# Patient Record
Sex: MALE | Race: BLACK OR AFRICAN AMERICAN
[De-identification: names, ages, dates, MRNs, and addresses within clinical notes are randomized per-mention and may not be internally consistent; named-entity substitution may affect disease eponyms.]

---

## 2019-02-21 ENCOUNTER — HOSPITAL ENCOUNTER (EMERGENCY)
Dept: HOSPITAL 36 - ER | Age: 59
Discharge: HOME | End: 2019-02-21
Payer: COMMERCIAL

## 2019-02-21 DIAGNOSIS — E87.6: ICD-10-CM

## 2019-02-21 DIAGNOSIS — E11.65: ICD-10-CM

## 2019-02-21 DIAGNOSIS — R00.2: Primary | ICD-10-CM

## 2019-02-21 DIAGNOSIS — Z98.890: ICD-10-CM

## 2019-02-21 DIAGNOSIS — I10: ICD-10-CM

## 2019-02-21 LAB
ALBUMIN SERPL-MCNC: 4.4 GM/DL (ref 4.2–5.5)
ALBUMIN/GLOB SERPL: 1.6 {RATIO} (ref 1–1.8)
ALP SERPL-CCNC: 56 U/L (ref 34–104)
ALT SERPL-CCNC: 31 U/L (ref 7–52)
AMPHET UR-MCNC: NEGATIVE NG/ML
ANION GAP SERPL CALC-SCNC: 12 MMOL/L (ref 7–16)
AST SERPL-CCNC: 35 U/L (ref 13–39)
BARBITURATES UR-MCNC: NEGATIVE UG/ML
BENZODIAZEPINES PNL UR: NEGATIVE
BILIRUB SERPL-MCNC: 1.1 MG/DL (ref 0.3–1)
BUN SERPL-MCNC: 18 MG/DL (ref 7–25)
CALCIUM SERPL-MCNC: 9.6 MG/DL (ref 8.6–10.3)
CANNABINOIDS SERPL QL CFM: NEGATIVE
CHLORIDE SERPL-SCNC: 97 MEQ/L (ref 98–107)
CHOLEST SERPL-MCNC: 130 MG/DL (ref ?–200)
CK SERPL-CCNC: 430 U/L (ref 30–223)
CO2 SERPL-SCNC: 27 MEQ/L (ref 21–31)
COCAINE METAB.OTHER UR-MCNC: NEGATIVE NG/ML
CREAT SERPL-MCNC: 1 MG/DL (ref 0.7–1.3)
D DIMER PPP FEU-MCNC: < 100 NG/ML (ref 100–400)
EOSINOPHIL NFR BLD: 1 % (ref 0–5)
ERYTHROCYTE [DISTWIDTH] IN BLOOD BY AUTOMATED COUNT: 12.7 % (ref 11.5–20)
GLOBULIN SER-MCNC: 2.8 GM/DL
GLUCOSE SERPL-MCNC: 293 MG/DL (ref 70–105)
HCT VFR BLD CALC: 45.1 % (ref 41–60)
HDLC SERPL-MCNC: 46 MG/DL (ref 23–92)
HGB BLD-MCNC: 14.4 GM/DL (ref 12–16)
INR PPP: 1.18 (ref 0.5–1.4)
LYMPHOCYTES # BLD MANUAL: 52 % (ref 20–50)
MCH RBC QN AUTO: 26.2 PG (ref 26–30)
MCHC RBC AUTO-ENTMCNC: 32 PG (ref 28–36)
MCV RBC AUTO: 81.8 FL (ref 80–99)
METHADONE UR CFM-MCNC: NEGATIVE NG/ML
METHAMPHET UR QL: NEGATIVE
MONOCYTES # BLD MANUAL: 9 % (ref 2–10)
NEUTROPHILS NFR BLD AUTO: 38 % (ref 40–80)
OPIATES UR QL: NEGATIVE
PCP UR-MCNC: NEGATIVE UG/L
PLATELET # BLD EST: ADEQUATE 10*3/UL
PLATELET # BLD: 169 TH/CMM (ref 150–400)
PMV BLD AUTO: 8.4 FL
POTASSIUM SERPL-SCNC: 3 MEQ/L (ref 3.5–5.1)
PROTHROMBIN TIME: 12.1 SECONDS (ref 9.5–11.5)
RBC # BLD AUTO: 5.51 MIL/CMM (ref 4.3–5.7)
SODIUM SERPL-SCNC: 133 MEQ/L (ref 136–145)
TRICYCLICS UR QL: NEGATIVE
TRIGL SERPL-MCNC: 48 MG/DL (ref ?–150)
WBC # BLD AUTO: 3.7 TH/CMM (ref 4.8–10.8)

## 2019-02-21 PROCEDURE — 85025 COMPLETE CBC W/AUTO DIFF WBC: CPT

## 2019-02-21 PROCEDURE — 96374 THER/PROPH/DIAG INJ IV PUSH: CPT

## 2019-02-21 PROCEDURE — 83880 ASSAY OF NATRIURETIC PEPTIDE: CPT

## 2019-02-21 PROCEDURE — 93005 ELECTROCARDIOGRAM TRACING: CPT

## 2019-02-21 PROCEDURE — 71045 X-RAY EXAM CHEST 1 VIEW: CPT

## 2019-02-21 PROCEDURE — Z7610: HCPCS

## 2019-02-21 PROCEDURE — Z7502: HCPCS

## 2019-02-21 PROCEDURE — 36415 COLL VENOUS BLD VENIPUNCTURE: CPT

## 2019-02-21 PROCEDURE — 85007 BL SMEAR W/DIFF WBC COUNT: CPT

## 2019-02-21 PROCEDURE — 80061 LIPID PANEL: CPT

## 2019-02-21 PROCEDURE — 82550 ASSAY OF CK (CPK): CPT

## 2019-02-21 PROCEDURE — 80307 DRUG TEST PRSMV CHEM ANLYZR: CPT

## 2019-02-21 PROCEDURE — 87040 BLOOD CULTURE FOR BACTERIA: CPT

## 2019-02-21 PROCEDURE — 83735 ASSAY OF MAGNESIUM: CPT

## 2019-02-21 PROCEDURE — 80053 COMPREHEN METABOLIC PANEL: CPT

## 2019-02-21 PROCEDURE — 83605 ASSAY OF LACTIC ACID: CPT

## 2019-02-21 PROCEDURE — 83036 HEMOGLOBIN GLYCOSYLATED A1C: CPT

## 2019-02-21 PROCEDURE — 85610 PROTHROMBIN TIME: CPT

## 2019-02-21 PROCEDURE — 82553 CREATINE MB FRACTION: CPT

## 2019-02-21 PROCEDURE — 99284 EMERGENCY DEPT VISIT MOD MDM: CPT

## 2019-02-21 PROCEDURE — 85379 FIBRIN DEGRADATION QUANT: CPT

## 2019-02-21 PROCEDURE — 84484 ASSAY OF TROPONIN QUANT: CPT

## 2019-02-21 PROCEDURE — 80320 DRUG SCREEN QUANTALCOHOLS: CPT

## 2019-02-21 NOTE — ED PHYSICIAN CHART
ED Chief Complaint/HPI





- Patient Information


Date Seen:: 19


Time Seen:: 07:10


Chief Complaint:: palpitations


History of Present Illness:: 


At about 0500 patient took Cozaar 25 mg and hydrochlorothiazide 12.5 mg a few 

minutes after which he developed palpitations (which were described as rapid 

heartbeat) and anxiety because of the palpitations.  Patient has been taking 

Cozaar and hydrochlorothiazide for last 1-2 years.  No chest pain or shortness 

of breath.  No recent vomiting or diarrhea


Allergies:: 


 Allergies











Allergy/AdvReac Type Severity Reaction Status Date / Time


 


No Known Allergies Allergy   Verified 19 05:27











Vitals:: 


 Vital Signs - 8 hr











  19





  05:30 05:38 07:14


 


Temp 97.2 F 98.0 F 98.2 F


 


 116 79


 


RR 19 12 13


 


/94 144/76 131/67


 


O2 Sat % 99  











Historian:: Patient





ED Review of Systems





- Review of Systems


General/Constitutional: No fever, No chills, No weight loss, No weakness, No 

diaphoresis, No edema, No loss of appetite


Skin: No skin lesions, No rash, No bruising


Head: No headache, No light-headedness


Eyes: No loss of vision, No pain, No diplopia


ENT: No earache, No nasal drainage, No sore throat, No tinnitus


Neck: No neck pain, No swelling, No thyromegaly, No stiffness, No mass noted


Cardio Vascular: No chest pain, Palpitations, No PND, No orthopnea, No edema


Pulmonary: No SOB, No cough, No sputum, No wheezing


GI: No nausea, No vomiting, No diarrhea, No pain, No melena, No hematochezia, 

No constipation, No hematemesis


G/U: No dysuria, No frequency, No hematuria


Musculoskeletal: No bone or joint pain, No back pain, No muscle pain


Endocrine: No polyuria, No polydipsia


Psychiatric: No prior psych history, No depression, Anxiety, No suicidal 

ideation


Hematopoietic: No bruising, No lymphadenopathy


Allergic/Immuno: No urticaria, No angioedema


Neurological: No syncope, No focal symptoms, No weakness, No paresthesia, No 

headache, No seizure, No dizziness, No confusion, No vertigo





ED Past Medical History





- Past Medical History


Past Medical History: HTN, Other (final allergies)


Family History: Diabetes Melitus, HTN


Social History: Non Smoker, No Alcohol


Surgical History: other (nose probably from nasal polyps)


Psychiatricy History: None


Medication: Reviewed





Family Medical History





- Family Member


  ** Mother


History Unknown: Yes


Living Status: 


Hx Family Diabetes: Yes





ED Physical Exam





- Physical Examination


General/Constitutional: Awake, Well-developed, well-nourished, Alert, No 

distress, GCS 15, Non-toxic appearing, Ambulatory


Head: Atraumatic


Eyes: Lids, conjuctiva normal, PERRL, EOMI


Skin: Nl inspection, No rash, No skin lesions, No ecchymosis, Well hydrated, No 

lymphadenopathy


ENMT: External ears, nose nl, Nasal exam nl


Other ENMT comments:: 





Several teeth absent


Neck: Nontender, Full ROM w/o pain, No JVD, No nuchal rigidity, No bruit, No 

mass, No stridor


Respiratory: Nl effort/Exclusion, Clear to Auscultation, No Wheeze/Rhonchi/Rales


Cardio Vascular: RRR, NL S1 S2


Other Cardio Vascular comments:: 





1.5 out of 4 systolic murmur heard along the left sternal border


GI: No tenderness/rebounding/guarding, No organomegaly, No hernia, Normal BS's, 

Nondistended, No mass/bruits, No McBurney tenderness


: No CVA tenderness


Extremities: No tenderness or effusion, Full ROM, normal strength in all 

extremities, No edema, Normal digits & nails


Neuro/Psych: Alert/oriented, DTR's symmetric, Normal sensory exam, Normal motor 

strength, Judgement/insight normal, Mood normal, Normal gait, No focal deficits


Misc: Normal back, No paraspinal tenderness





ED Labs/Radiology/EKG Results





- Lab Results


Results: 


 Laboratory Tests











  19





  05:56 05:56 05:56


 


WBC  3.7 L  


 


RBC  5.51  


 


Hgb  14.4  


 


Hct  45.1  


 


MCV  81.8  


 


MCH  26.2  


 


MCHC Differential  32.0  


 


RDW  12.7  


 


Plt Count  169  


 


MPV  8.4  


 


Add Manual Diff  YES  


 


D-Dimer   < 100 L 


 


Troponin I   


 


B-Natriuretic Peptide    9.9


 


Urine Opiates Screen   


 


Urine Methadone Screen   


 


Ur Barbiturates Screen   


 


Ur Tricyclics Screen   


 


Ur Phencyclidine Scrn   


 


Amphetamines Screen   


 


U Methamphetamines Scrn   


 


U Benzodiazepines Scrn   


 


U Cocaine Metab Screen   


 


U Cannabinoids Screen   














  19





  05:56 06:24


 


WBC  


 


RBC  


 


Hgb  


 


Hct  


 


MCV  


 


MCH  


 


MCHC Differential  


 


RDW  


 


Plt Count  


 


MPV  


 


Add Manual Diff  


 


D-Dimer  


 


Troponin I  < 0.01 L 


 


B-Natriuretic Peptide  


 


Urine Opiates Screen   NEGATIVE


 


Urine Methadone Screen   NEGATIVE


 


Ur Barbiturates Screen   NEGATIVE


 


Ur Tricyclics Screen   NEGATIVE


 


Ur Phencyclidine Scrn   NEGATIVE


 


Amphetamines Screen   NEGATIVE


 


U Methamphetamines Scrn   NEGATIVE


 


U Benzodiazepines Scrn   NEGATIVE


 


U Cocaine Metab Screen   NEGATIVE


 


U Cannabinoids Screen   NEGATIVE














- Radiology Results


Results: 





Chest x-ray normal





- EKG Interpretations


Rate & Rhythm: sinus tachycardia with a rate of 119


Axis: borderline left axis deviation


Comments:: 





Right ventricular prominence in the anterior leads





ED Assessment





- Assessment


General Assessment: 





Usually when patient takes 12.5 mg of hydrochlorothiazide daily potassium 

replacement is not required.  However this patient's potassium is 3.0.  He 

tolerated 40 mEq potassium orally and will be given another 20 mEq before 

discharge.  He was urged to see his doctor as soon as possible and in the 

meantime to eat a couple extra bananas every day for the potassium may contain.

  Etiology of the patient's palpitations and episodes of sinus tachycardia 

remained uncertain but may be related to the hypokalemia.





ED Septic Shock





- .


Is Septic Shock (SBP<90, OR Lactate>4 mmol\L) present?: No





- <6hrs of presentation:


Vital Signs: 


 Vital Signs - 8 hr











  19





  05:30 05:38 07:14


 


Temp 97.2 F 98.0 F 98.2 F


 


 116 79


 


RR 19 12 13


 


/94 144/76 131/67


 


O2 Sat % 99  














ED Reassessment (Disposition)





- Reassessment


Reassessment Condition:: Improved





- Diagnosis


Diagnosis:: 





Hypokalemia; palpitations; hyperglycemia; diabetes





- Aftercare/Follow up Instructions


Aftercare/Follow-Up Instructions:: Refer to Discharge Instructions





- Patient Disposition


Discharge/Transfer:: Home


Condition at Disposition:: Stable, Improved

## 2019-08-25 ENCOUNTER — HOSPITAL ENCOUNTER (EMERGENCY)
Dept: HOSPITAL 36 - ER | Age: 59
Discharge: HOME | End: 2019-08-25
Payer: COMMERCIAL

## 2019-08-25 DIAGNOSIS — R51: ICD-10-CM

## 2019-08-25 DIAGNOSIS — I10: Primary | ICD-10-CM

## 2019-08-25 PROCEDURE — Z7610: HCPCS

## 2019-08-25 NOTE — ED PHYSICIAN CHART
ED Chief Complaint/HPI





- Patient Information


Date Seen:: 19


Time Seen:: 19:39


Chief Complaint:: headache


History of Present Illness:: 





59 yr old male with hx of htn on 2 meds f/ued up regularly by dr ascencio who ate 

some salty foods and feels some bifrontal headache 3/10 manageable never took 

any tylenol motrin no numbness weakness no gait instability or balance problems


Allergies:: 


 Allergies











Allergy/AdvReac Type Severity Reaction Status Date / Time


 


No Known Allergies Allergy   Verified 19 19:31











Vitals:: 


 Vital Signs - 8 hr











  19





  19:25


 


Temp 99.1 F


 


HR 83


 


RR 15


 


/72


 


O2 Sat % 99














ED Review of Systems





- Review of Systems


General/Constitutional: No fever, No chills, No weight loss, No weakness, No 

diaphoresis, No edema, No loss of appetite


Skin: No skin lesions, No rash, No bruising


Head: Headache


Eyes: No loss of vision, No pain, No diplopia


ENT: No earache, No nasal drainage, No sore throat, No tinnitus


Neck: No neck pain, No swelling, No thyromegaly, No stiffness, No mass noted


Cardio Vascular: No chest pain, No palpitations, No PND, No orthopnea, No edema


Pulmonary: No SOB, No cough, No sputum, No wheezing


GI: No nausea, No vomiting, No diarrhea, No pain, No melena, No hematochezia, 

No constipation, No hematemesis


G/U: No dysuria, No frequency, No hematuria


Musculoskeletal: No bone or joint pain, No back pain, No muscle pain


Endocrine: No polyuria, No polydipsia


Psychiatric: No prior psych history, No depression, No anxiety, No suicidal 

ideation


Hematopoietic: No bruising, No lymphadenopathy


Allergic/Immuno: No urticaria, No angioedema


Neurological: No syncope, No focal symptoms, No weakness, No paresthesia, No 

headache, No seizure, No dizziness, No confusion, No vertigo





ED Past Medical History





- Past Medical History


Past Medical History: HTN





Family Medical History





- Family Member


  ** Mother


History Unknown: Yes


Living Status: 


Hx Family Diabetes: Yes





ED Physical Exam





- Physical Examination


General/Constitutional: Awake, Well-developed, well-nourished, Alert, No 

distress, GCS 15, Non-toxic appearing, Ambulatory


Head: Atraumatic


Eyes: Lids, conjuctiva normal, PERRL, EOMI


Skin: Nl inspection, No rash, No skin lesions, No ecchymosis, Well hydrated, No 

lymphadenopathy


ENMT: External ears, nose nl, Nasal exam nl, Lips, teeth, gums nl


Neck: Nontender, Full ROM w/o pain, No JVD, No nuchal rigidity, No bruit, No 

mass, No stridor


Respiratory: Nl effort/Exclusion, Clear to Auscultation, No Wheeze/Rhonchi/Rales


Cardio Vascular: RRR, No murmur, gallop, rubs, NL S1 S2


GI: No tenderness/rebounding/guarding, No organomegaly, No hernia, Normal BS's, 

Nondistended, No mass/bruits, No McBurney tenderness


: No CVA tenderness


Extremities: No tenderness or effusion, Full ROM, normal strength in all 

extremities, No edema, Normal digits & nails


Neuro/Psych: Alert/oriented, DTR's symmetric, Normal sensory exam, Normal motor 

strength, Judgement/insight normal, Mood normal, Normal gait, No focal deficits


Misc: Normal back, No paraspinal tenderness





ED Assessment





- Assessment


General Assessment: 





htn headache 





ED Septic Shock





- .


Is Septic Shock (SBP<90, OR Lactate>4 mmol\L) present?: No





- <6hrs of presentation:


Vital Signs: 


 Vital Signs - 8 hr











  /





  19:25


 


Temp 99.1 F


 


HR 83


 


RR 15


 


/72


 


O2 Sat % 99














ED Reassessment (Disposition)





- Reassessment


Reassessment:: 





headache htn





- Diagnosis


Diagnosis:: 





as above





- Patient Disposition


Discharge/Transfer:: Home


Condition at Disposition:: Stable